# Patient Record
Sex: FEMALE | Race: WHITE | Employment: UNEMPLOYED | URBAN - METROPOLITAN AREA
[De-identification: names, ages, dates, MRNs, and addresses within clinical notes are randomized per-mention and may not be internally consistent; named-entity substitution may affect disease eponyms.]

---

## 2021-05-04 ENCOUNTER — APPOINTMENT (OUTPATIENT)
Dept: CT IMAGING | Age: 36
End: 2021-05-04
Payer: COMMERCIAL

## 2021-05-04 ENCOUNTER — HOSPITAL ENCOUNTER (EMERGENCY)
Age: 36
Discharge: HOME | End: 2021-05-04
Attending: EMERGENCY MEDICINE
Payer: COMMERCIAL

## 2021-05-04 VITALS
SYSTOLIC BLOOD PRESSURE: 107 MMHG | OXYGEN SATURATION: 98 % | WEIGHT: 180 LBS | BODY MASS INDEX: 31.89 KG/M2 | RESPIRATION RATE: 18 BRPM | DIASTOLIC BLOOD PRESSURE: 83 MMHG | HEIGHT: 63 IN | TEMPERATURE: 98.1 F | HEART RATE: 79 BPM

## 2021-05-04 DIAGNOSIS — R51.9 NONINTRACTABLE HEADACHE, UNSPECIFIED CHRONICITY PATTERN, UNSPECIFIED HEADACHE TYPE: Primary | ICD-10-CM

## 2021-05-04 DIAGNOSIS — R42 DIZZINESS: ICD-10-CM

## 2021-05-04 LAB
A/G RATIO: 1.2 (ref 1.1–2.2)
ALBUMIN SERPL-MCNC: 4.4 G/DL (ref 3.4–5)
ALP BLD-CCNC: 68 U/L (ref 40–129)
ALT SERPL-CCNC: 25 U/L (ref 10–40)
ANION GAP SERPL CALCULATED.3IONS-SCNC: 13 MMOL/L (ref 3–16)
AST SERPL-CCNC: 17 U/L (ref 15–37)
BASOPHILS ABSOLUTE: 0.1 K/UL (ref 0–0.2)
BASOPHILS RELATIVE PERCENT: 0.9 %
BILIRUB SERPL-MCNC: 0.4 MG/DL (ref 0–1)
BUN BLDV-MCNC: 10 MG/DL (ref 7–20)
CALCIUM SERPL-MCNC: 10 MG/DL (ref 8.3–10.6)
CHLORIDE BLD-SCNC: 102 MMOL/L (ref 99–110)
CO2: 24 MMOL/L (ref 21–32)
CREAT SERPL-MCNC: 0.6 MG/DL (ref 0.6–1.1)
EOSINOPHILS ABSOLUTE: 0.1 K/UL (ref 0–0.6)
EOSINOPHILS RELATIVE PERCENT: 0.8 %
GFR AFRICAN AMERICAN: >60
GFR NON-AFRICAN AMERICAN: >60
GLOBULIN: 3.7 G/DL
GLUCOSE BLD-MCNC: 88 MG/DL (ref 70–99)
HCG QUALITATIVE: NEGATIVE
HCT VFR BLD CALC: 43.4 % (ref 36–48)
HEMOGLOBIN: 14.7 G/DL (ref 12–16)
LYMPHOCYTES ABSOLUTE: 1.7 K/UL (ref 1–5.1)
LYMPHOCYTES RELATIVE PERCENT: 18 %
MCH RBC QN AUTO: 28 PG (ref 26–34)
MCHC RBC AUTO-ENTMCNC: 33.8 G/DL (ref 31–36)
MCV RBC AUTO: 82.8 FL (ref 80–100)
MONOCYTES ABSOLUTE: 0.6 K/UL (ref 0–1.3)
MONOCYTES RELATIVE PERCENT: 6.2 %
NEUTROPHILS ABSOLUTE: 7 K/UL (ref 1.7–7.7)
NEUTROPHILS RELATIVE PERCENT: 74.1 %
PDW BLD-RTO: 15.2 % (ref 12.4–15.4)
PLATELET # BLD: 275 K/UL (ref 135–450)
PMV BLD AUTO: 8.5 FL (ref 5–10.5)
POTASSIUM REFLEX MAGNESIUM: 3.7 MMOL/L (ref 3.5–5.1)
RBC # BLD: 5.23 M/UL (ref 4–5.2)
SODIUM BLD-SCNC: 139 MMOL/L (ref 136–145)
TOTAL PROTEIN: 8.1 G/DL (ref 6.4–8.2)
WBC # BLD: 9.4 K/UL (ref 4–11)

## 2021-05-04 PROCEDURE — 96365 THER/PROPH/DIAG IV INF INIT: CPT

## 2021-05-04 PROCEDURE — 80053 COMPREHEN METABOLIC PANEL: CPT

## 2021-05-04 PROCEDURE — 85025 COMPLETE CBC W/AUTO DIFF WBC: CPT

## 2021-05-04 PROCEDURE — 70498 CT ANGIOGRAPHY NECK: CPT

## 2021-05-04 PROCEDURE — 2580000003 HC RX 258: Performed by: EMERGENCY MEDICINE

## 2021-05-04 PROCEDURE — 6360000002 HC RX W HCPCS: Performed by: EMERGENCY MEDICINE

## 2021-05-04 PROCEDURE — 96375 TX/PRO/DX INJ NEW DRUG ADDON: CPT

## 2021-05-04 PROCEDURE — 93005 ELECTROCARDIOGRAM TRACING: CPT | Performed by: EMERGENCY MEDICINE

## 2021-05-04 PROCEDURE — 70450 CT HEAD/BRAIN W/O DYE: CPT

## 2021-05-04 PROCEDURE — 6360000004 HC RX CONTRAST MEDICATION: Performed by: EMERGENCY MEDICINE

## 2021-05-04 PROCEDURE — 99283 EMERGENCY DEPT VISIT LOW MDM: CPT

## 2021-05-04 PROCEDURE — 84703 CHORIONIC GONADOTROPIN ASSAY: CPT

## 2021-05-04 RX ORDER — PROCHLORPERAZINE EDISYLATE 5 MG/ML
10 INJECTION INTRAMUSCULAR; INTRAVENOUS ONCE
Status: COMPLETED | OUTPATIENT
Start: 2021-05-04 | End: 2021-05-04

## 2021-05-04 RX ORDER — KETOROLAC TROMETHAMINE 30 MG/ML
15 INJECTION, SOLUTION INTRAMUSCULAR; INTRAVENOUS ONCE
Status: COMPLETED | OUTPATIENT
Start: 2021-05-04 | End: 2021-05-04

## 2021-05-04 RX ORDER — BUTALBITAL, ACETAMINOPHEN AND CAFFEINE 50; 325; 40 MG/1; MG/1; MG/1
1 TABLET ORAL EVERY 6 HOURS PRN
Qty: 30 TABLET | Refills: 0 | Status: SHIPPED | OUTPATIENT
Start: 2021-05-04

## 2021-05-04 RX ORDER — MAGNESIUM SULFATE HEPTAHYDRATE 500 MG/ML
1000 INJECTION, SOLUTION INTRAMUSCULAR; INTRAVENOUS ONCE
Status: DISCONTINUED | OUTPATIENT
Start: 2021-05-04 | End: 2021-05-04 | Stop reason: CLARIF

## 2021-05-04 RX ORDER — MAGNESIUM SULFATE 1 G/100ML
1000 INJECTION INTRAVENOUS ONCE
Status: COMPLETED | OUTPATIENT
Start: 2021-05-04 | End: 2021-05-04

## 2021-05-04 RX ORDER — 0.9 % SODIUM CHLORIDE 0.9 %
1000 INTRAVENOUS SOLUTION INTRAVENOUS ONCE
Status: COMPLETED | OUTPATIENT
Start: 2021-05-04 | End: 2021-05-04

## 2021-05-04 RX ORDER — DIPHENHYDRAMINE HYDROCHLORIDE 50 MG/ML
25 INJECTION INTRAMUSCULAR; INTRAVENOUS ONCE
Status: COMPLETED | OUTPATIENT
Start: 2021-05-04 | End: 2021-05-04

## 2021-05-04 RX ADMIN — KETOROLAC TROMETHAMINE 15 MG: 30 INJECTION, SOLUTION INTRAMUSCULAR; INTRAVENOUS at 19:32

## 2021-05-04 RX ADMIN — PROCHLORPERAZINE EDISYLATE 10 MG: 5 INJECTION INTRAMUSCULAR; INTRAVENOUS at 17:44

## 2021-05-04 RX ADMIN — SODIUM CHLORIDE 1000 ML: 9 INJECTION, SOLUTION INTRAVENOUS at 17:44

## 2021-05-04 RX ADMIN — IOPAMIDOL 85 ML: 755 INJECTION, SOLUTION INTRAVENOUS at 18:58

## 2021-05-04 RX ADMIN — MAGNESIUM SULFATE HEPTAHYDRATE 1000 MG: 1 INJECTION, SOLUTION INTRAVENOUS at 19:07

## 2021-05-04 RX ADMIN — DIPHENHYDRAMINE HYDROCHLORIDE 25 MG: 50 INJECTION, SOLUTION INTRAMUSCULAR; INTRAVENOUS at 17:44

## 2021-05-04 ASSESSMENT — PAIN DESCRIPTION - LOCATION: LOCATION: HEAD

## 2021-05-04 ASSESSMENT — PAIN DESCRIPTION - PAIN TYPE: TYPE: ACUTE PAIN

## 2021-05-04 ASSESSMENT — PAIN SCALES - GENERAL: PAINLEVEL_OUTOF10: 10

## 2021-05-04 NOTE — ED PROVIDER NOTES
[address] ED    CHIEF COMPLAINT  Dizziness (patient states thursday at the grocery store she turned her head and started to feel dizzy. Patient called her son to pick her up and she had a breif syncope episode. Tong went to [facility] today because she continues to feel dizzy and has pressure in her head. NP at [facility] sent her to ER for eval for vertigo, and states patients pupils were sluggish to react. NP also reports positive Romberg test)       HISTORY OF PRESENT ILLNESS  Ej Tom is a 28 y.o. female who presents to the ED with complaint of dizziness. Pt developed symptoms on Thursday. At that time, developed a headache that she initially thought felt consistent with previous migraines. Went to [address] to get some groceries. Turned her head to the right and felt dizziness. Son came to get her and reported that she passed out. With regard to the headache, this feels different than her previous migraines. In this instance the headache is diffuse, across the front of the head and radiating down the left side of her neck. 10/10 intensity, throbbing. Migraines are usually more of an aching sensation. Complains of blurry vision with spots in it. Felt generally weak on Thursday but no focal weakness or numbness. Denies chest pain, SOB. Complains of nausea. Emesis on Thursday ada and Friday morning, now resolved. Denies dysuria, hematuria. No other complaints, modifying factors or associated symptoms. I have reviewed the following from the nursing documentation:    Past Medical History:   Diagnosis Date    Anxiety     Headache(784.0)     Influenza A 12/17/14    Major depressive disorder     Restless legs syndrome      Past Surgical History:   Procedure Laterality Date    CHOLECYSTECTOMY      UTERINE FIBROID SURGERY       History reviewed. No pertinent family history.   Social History     Socioeconomic History    Marital status:      Spouse name: Not on file    Number of children: Not on file    Years of education: Not on file    Highest education level: Not on file   Occupational History    Not on file   Social Needs    Financial resource strain: Not on file    Food insecurity     Worry: Not on file     Inability: Not on file    Transportation needs     Medical: Not on file     Non-medical: Not on file   Tobacco Use    Smoking status: Former Smoker     Packs/day: 0.25    Smokeless tobacco: Never Used   Substance and Sexual Activity    Alcohol use: Yes     Comment: socially    Drug use: No    Sexual activity: Yes     Partners: Male   Lifestyle    Physical activity     Days per week: Not on file     Minutes per session: Not on file    Stress: Not on file   Relationships    Social connections     Talks on phone: Not on file     Gets together: Not on file     Attends Druze service: Not on file     Active member of club or organization: Not on file     Attends meetings of clubs or organizations: Not on file     Relationship status: Not on file    Intimate partner violence     Fear of current or ex partner: Not on file     Emotionally abused: Not on file     Physically abused: Not on file     Forced sexual activity: Not on file   Other Topics Concern    Not on file   Social History Narrative    Not on file     Current Facility-Administered Medications   Medication Dose Route Frequency Provider Last Rate Last Admin    magnesium sulfate 1000 mg in dextrose 5% 100 mL IVPB  1,000 mg Intravenous Once Josefina Vigil  mL/hr at 05/04/21 1907 1,000 mg at 05/04/21 1907    ketorolac (TORADOL) injection 15 mg  15 mg Intravenous Once Josefina Vigil MD         Current Outpatient Medications   Medication Sig Dispense Refill    butalbital-acetaminophen-caffeine (FIORICET, ESGIC) -40 MG per tablet Take 1 tablet by mouth every 6 hours as needed for Migraine 30 tablet 0    gabapentin (NEURONTIN) 300 MG capsule 1 tab at hs  For 2 weeks then increase to 2 po Q-T Interval 394 ms    QTc Calculation (Bazett) 434 ms    P Axis 64 degrees    R Axis 62 degrees    T Axis 40 degrees    Diagnosis       Normal sinus rhythmNormal ECGNo previous ECGs available       RADIOLOGY  I have reviewed all radiographic studies for this visit. CTA HEAD NECK W CONTRAST   Final Result   Unremarkable CTA of the head and neck. CT HEAD WO CONTRAST   Final Result   No acute intracranial abnormality. ECG  EKG interpreted by myself. Rate: normal  Rhythm: NSR  Axis: normal  Intervals: within normal limits  ST Segments: no acute abnormality  T waves: no acute abnormality  Comparison: Compared to 6/4/16, rate is slower by 31 bpm and there is resolution of PVCs  Impression: NSR with no acute abnormality       ED COURSE/MDM  Patient seen and evaluated. Old records reviewed. Labs and imaging reviewed and results discussed with patient/family to extent possible. This is a 24-year-old female who presents with complaint of headache and dizziness with change in posture. On arrival patient is slightly hypertensive with otherwise reassuring vital signs. Neurological exam is nonfocal.  There are no carotid bruits. Symptoms are not elicited with change in posture. EKG normal sinus rhythm with no acute abnormality. Given headache different than previous migraines did obtain noncontrast head CT and CT angiography of the head and neck, both of which were unremarkable. Patient did prefer the CT angiography as opposed to a lumbar puncture after lengthy discussion. Renal panel no significant electrolyte abnormalities or creatinine elevation. Pregnancy test negative. CBC no leukocytosis or anemia. Symptoms improved after Compazine, Benadryl, fluids, magnesium. With reassuring cross-sectional imaging will administer Toradol. Patient states she feels well enough to go home. Will prescribe Fioricet on an as-needed basis.   Patient requests us to arrange for PCP for her, which was

## 2021-05-05 LAB
EKG ATRIAL RATE: 73 BPM
EKG DIAGNOSIS: NORMAL
EKG P AXIS: 64 DEGREES
EKG P-R INTERVAL: 130 MS
EKG Q-T INTERVAL: 394 MS
EKG QRS DURATION: 90 MS
EKG QTC CALCULATION (BAZETT): 434 MS
EKG R AXIS: 62 DEGREES
EKG T AXIS: 40 DEGREES
EKG VENTRICULAR RATE: 73 BPM

## 2021-05-05 PROCEDURE — 93010 ELECTROCARDIOGRAM REPORT: CPT | Performed by: INTERNAL MEDICINE

## 2021-05-05 NOTE — ED NOTES
..Patient discharged to home, alert, oriented, skin warm, dry and pink. Denies needs and or questions.  Will follow up as directed, patient encouraged to return for worsening or new symptoms or other concerns       Katia Aggarwal RN  05/04/21 2011

## 2022-05-30 ENCOUNTER — HOSPITAL ENCOUNTER (EMERGENCY)
Age: 37
Discharge: HOME | End: 2022-05-30
Attending: EMERGENCY MEDICINE

## 2022-05-30 ENCOUNTER — APPOINTMENT (OUTPATIENT)
Dept: GENERAL RADIOLOGY | Age: 37
End: 2022-05-30

## 2022-05-30 VITALS
TEMPERATURE: 98.6 F | RESPIRATION RATE: 18 BRPM | DIASTOLIC BLOOD PRESSURE: 80 MMHG | HEIGHT: 63 IN | OXYGEN SATURATION: 97 % | WEIGHT: 176 LBS | HEART RATE: 85 BPM | BODY MASS INDEX: 31.18 KG/M2 | SYSTOLIC BLOOD PRESSURE: 126 MMHG

## 2022-05-30 DIAGNOSIS — R07.9 CHEST PAIN, UNSPECIFIED TYPE: Primary | ICD-10-CM

## 2022-05-30 LAB
A/G RATIO: 2.2 (ref 1.1–2.2)
ALBUMIN SERPL-MCNC: 4.9 G/DL (ref 3.4–5)
ALP BLD-CCNC: 75 U/L (ref 40–129)
ALT SERPL-CCNC: 15 U/L (ref 10–40)
ANION GAP SERPL CALCULATED.3IONS-SCNC: 14 MMOL/L (ref 3–16)
AST SERPL-CCNC: 17 U/L (ref 15–37)
BASOPHILS ABSOLUTE: 0.1 K/UL (ref 0–0.2)
BASOPHILS RELATIVE PERCENT: 0.5 %
BILIRUB SERPL-MCNC: 0.6 MG/DL (ref 0–1)
BUN BLDV-MCNC: 17 MG/DL (ref 7–20)
CALCIUM SERPL-MCNC: 9.2 MG/DL (ref 8.3–10.6)
CHLORIDE BLD-SCNC: 100 MMOL/L (ref 99–110)
CO2: 23 MMOL/L (ref 21–32)
CREAT SERPL-MCNC: 1 MG/DL (ref 0.6–1.1)
EKG ATRIAL RATE: 95 BPM
EKG DIAGNOSIS: NORMAL
EKG P AXIS: 58 DEGREES
EKG P-R INTERVAL: 126 MS
EKG Q-T INTERVAL: 350 MS
EKG QRS DURATION: 82 MS
EKG QTC CALCULATION (BAZETT): 439 MS
EKG R AXIS: 33 DEGREES
EKG T AXIS: 29 DEGREES
EKG VENTRICULAR RATE: 95 BPM
EOSINOPHILS ABSOLUTE: 0.1 K/UL (ref 0–0.6)
EOSINOPHILS RELATIVE PERCENT: 0.6 %
GFR AFRICAN AMERICAN: >60
GFR NON-AFRICAN AMERICAN: >60
GLUCOSE BLD-MCNC: 169 MG/DL (ref 70–99)
HCT VFR BLD CALC: 39.6 % (ref 36–48)
HEMOGLOBIN: 13.5 G/DL (ref 12–16)
LYMPHOCYTES ABSOLUTE: 1.3 K/UL (ref 1–5.1)
LYMPHOCYTES RELATIVE PERCENT: 10.9 %
MCH RBC QN AUTO: 28.5 PG (ref 26–34)
MCHC RBC AUTO-ENTMCNC: 34 G/DL (ref 31–36)
MCV RBC AUTO: 83.8 FL (ref 80–100)
MONOCYTES ABSOLUTE: 0.6 K/UL (ref 0–1.3)
MONOCYTES RELATIVE PERCENT: 5.5 %
NEUTROPHILS ABSOLUTE: 9.5 K/UL (ref 1.7–7.7)
NEUTROPHILS RELATIVE PERCENT: 82.5 %
PDW BLD-RTO: 14.1 % (ref 12.4–15.4)
PLATELET # BLD: 278 K/UL (ref 135–450)
PMV BLD AUTO: 7.7 FL (ref 5–10.5)
POTASSIUM SERPL-SCNC: 3.4 MMOL/L (ref 3.5–5.1)
RBC # BLD: 4.73 M/UL (ref 4–5.2)
SODIUM BLD-SCNC: 137 MMOL/L (ref 136–145)
TOTAL PROTEIN: 7.1 G/DL (ref 6.4–8.2)
TROPONIN: <0.01 NG/ML
TROPONIN: <0.01 NG/ML
WBC # BLD: 11.5 K/UL (ref 4–11)

## 2022-05-30 PROCEDURE — 99285 EMERGENCY DEPT VISIT HI MDM: CPT

## 2022-05-30 PROCEDURE — 85025 COMPLETE CBC W/AUTO DIFF WBC: CPT

## 2022-05-30 PROCEDURE — 93010 ELECTROCARDIOGRAM REPORT: CPT | Performed by: INTERNAL MEDICINE

## 2022-05-30 PROCEDURE — 6360000002 HC RX W HCPCS: Performed by: EMERGENCY MEDICINE

## 2022-05-30 PROCEDURE — 96374 THER/PROPH/DIAG INJ IV PUSH: CPT

## 2022-05-30 PROCEDURE — 80053 COMPREHEN METABOLIC PANEL: CPT

## 2022-05-30 PROCEDURE — 93005 ELECTROCARDIOGRAM TRACING: CPT | Performed by: EMERGENCY MEDICINE

## 2022-05-30 PROCEDURE — 96375 TX/PRO/DX INJ NEW DRUG ADDON: CPT

## 2022-05-30 PROCEDURE — 84484 ASSAY OF TROPONIN QUANT: CPT

## 2022-05-30 PROCEDURE — 71045 X-RAY EXAM CHEST 1 VIEW: CPT

## 2022-05-30 RX ORDER — ONDANSETRON 2 MG/ML
4 INJECTION INTRAMUSCULAR; INTRAVENOUS ONCE
Status: COMPLETED | OUTPATIENT
Start: 2022-05-30 | End: 2022-05-30

## 2022-05-30 RX ORDER — KETOROLAC TROMETHAMINE 30 MG/ML
15 INJECTION, SOLUTION INTRAMUSCULAR; INTRAVENOUS ONCE
Status: COMPLETED | OUTPATIENT
Start: 2022-05-30 | End: 2022-05-30

## 2022-05-30 RX ORDER — CHLORAL HYDRATE 500 MG
3000 CAPSULE ORAL 3 TIMES DAILY
COMMUNITY

## 2022-05-30 RX ORDER — MORPHINE SULFATE 4 MG/ML
4 INJECTION, SOLUTION INTRAMUSCULAR; INTRAVENOUS ONCE
Status: COMPLETED | OUTPATIENT
Start: 2022-05-30 | End: 2022-05-30

## 2022-05-30 RX ADMIN — KETOROLAC TROMETHAMINE 15 MG: 30 INJECTION, SOLUTION INTRAMUSCULAR; INTRAVENOUS at 05:12

## 2022-05-30 RX ADMIN — ONDANSETRON 4 MG: 2 INJECTION INTRAMUSCULAR; INTRAVENOUS at 02:00

## 2022-05-30 RX ADMIN — MORPHINE SULFATE 4 MG: 4 INJECTION, SOLUTION INTRAMUSCULAR; INTRAVENOUS at 02:00

## 2022-05-30 ASSESSMENT — PAIN DESCRIPTION - LOCATION: LOCATION: CHEST

## 2022-05-30 ASSESSMENT — PAIN - FUNCTIONAL ASSESSMENT: PAIN_FUNCTIONAL_ASSESSMENT: 0-10

## 2022-05-30 ASSESSMENT — PAIN DESCRIPTION - DESCRIPTORS: DESCRIPTORS: PRESSURE

## 2022-05-30 ASSESSMENT — PAIN DESCRIPTION - PAIN TYPE: TYPE: ACUTE PAIN

## 2022-05-30 ASSESSMENT — PAIN SCALES - GENERAL
PAINLEVEL_OUTOF10: 6
PAINLEVEL_OUTOF10: 0

## 2022-05-30 NOTE — ED PROVIDER NOTES
[facility]      CHIEF COMPLAINT  Chest Pain      HISTORY OF PRESENT ILLNESS  Sonido Carroll is a 40 y.o. female with a history of anxiety, depression and restless leg syndrome presents with chest pain. She states she has been having chest pains for the past several days. She states that she saw her primary care doctor who referred her to cardiology for follow-up. She states she got concerned tonight because she got a little short of breath and diaphoretic at home. She is feeling little better here. No syncope. No cough or congestion. No fevers. She has no known cardiac history. .   No other complaints, modifying factors or associated symptoms. I have reviewed the following from the nursing documentation. Past Medical History:   Diagnosis Date    Anxiety     Headache(784.0)     Influenza A 12/17/14    Major depressive disorder     Restless legs syndrome      Past Surgical History:   Procedure Laterality Date    CHOLECYSTECTOMY      UTERINE FIBROID SURGERY       No family history on file.   Social History     Socioeconomic History    Marital status:      Spouse name: Not on file    Number of children: Not on file    Years of education: Not on file    Highest education level: Not on file   Occupational History    Not on file   Tobacco Use    Smoking status: Former Smoker     Packs/day: 0.25    Smokeless tobacco: Never Used   Substance and Sexual Activity    Alcohol use: Yes     Comment: socially    Drug use: No    Sexual activity: Yes     Partners: Male   Other Topics Concern    Not on file   Social History Narrative    Not on file     Social Determinants of Health     Financial Resource Strain:     Difficulty of Paying Living Expenses: Not on file   Food Insecurity:     Worried About Running Out of Food in the Last Year: Not on file    Alessandro of Food in the Last Year: Not on file   Transportation Needs:     Lack of Transportation (Medical): Not on file    Lack of Transportation (Non-Medical): Not on file   Physical Activity:     Days of Exercise per Week: Not on file    Minutes of Exercise per Session: Not on file   Stress:     Feeling of Stress : Not on file   Social Connections:     Frequency of Communication with Friends and Family: Not on file    Frequency of Social Gatherings with Friends and Family: Not on file    Attends [employer] Services: Not on file    Active Member of [address] [place] [address] or Organizations: Not on file    Attends Club or Organization Meetings: Not on file    Marital Status: Not on file   Intimate Partner Violence:     Fear of Current or Ex-Partner: Not on file    Emotionally Abused: Not on file    Physically Abused: Not on file    Sexually Abused: Not on file   Housing Stability:     Unable to Pay for Housing in the Last Year: Not on file    Number of [place] in the Last Year: Not on file    Unstable Housing in the Last Year: Not on file     No current facility-administered medications for this encounter. Current Outpatient Medications   Medication Sig Dispense Refill    Multiple Vitamin (MULTI-VITAMIN DAILY PO) Take by mouth      Omega-3 Fatty Acids (FISH OIL) 1000 MG CAPS Take 3,000 mg by mouth 3 times daily      butalbital-acetaminophen-caffeine (FIORICET, ESGIC) -40 MG per tablet Take 1 tablet by mouth every 6 hours as needed for Migraine 30 tablet 0    gabapentin (NEURONTIN) 300 MG capsule 1 tab at hs  For 2 weeks then increase to 2 po qhs 60 capsule 3    lidocaine (LIDODERM) 5 % Place 1 patch onto the skin daily. 12 hours on, 12 hours off. 30 patch 0    HYDROcodone-acetaminophen (NORCO) 5-325 MG per tablet Take 1 tablet by mouth every 8 hours as needed for Pain. 40 tablet 0    ibuprofen (ADVIL;MOTRIN) 600 MG tablet Take 1 tablet by mouth every 8 hours as needed for Pain.  90 tablet 3     Allergies   Allergen Reactions    Percocet [Oxycodone-Acetaminophen] Nausea Only    Codeine Nausea And Vomiting    Vicodin [Hydrocodone-Acetaminophen] Nausea And Vomiting       REVIEW OF SYSTEMS    General:  No fevers  Eyes:  No recent vison changes  ENT:  No sore throat, no nasal congestion  Cardiovascular:  no palpitations. Chest pains  Respiratory:   no cough, no wheezing  Gastrointestinal:  No abdominal pain, no vomiting, no diarrhea  Musculoskeletal:  No muscle pain, no joint pain  Skin:  No rash   Neurologic:  No speech problems, no headache, no extremity numbness, no extremity weakness  Genitourinary:  No dysuria  Extremities:  no edema, no pain      Unless otherwise stated in this report, this patient's positive and negative responses for review of systems (constitutional, eyes, ENT, cardiovascular, respiratory, gastrointestinal, neurological, genitourinary, musculoskeletal, integument systems and systems related to the presenting problem) are either stated in the preceding paragraph, were not pertinent or were negative for the symptoms and/or complaints related to the medical problem. PHYSICAL EXAM  /80   Pulse 85   Temp 98.6 °F (37 °C) (Oral)   Resp 18   Ht 5' 3\" (1.6 m)   Wt 176 lb (79.8 kg)   LMP 05/30/2022   SpO2 97%   BMI 31.18 kg/m²   GENERAL APPEARANCE: Awake and alert. Cooperative. No acute distress. HEAD: Normocephalic. Atraumatic. EYES: PERRL. EOM's grossly intact. ENT: Mucous membranes are moist.   NECK: Supple, trachea midline. HEART: RRR. Equal pulses in all 4 extremities. LUNGS: Respirations unlabored. CTAB. Good air exchange. No wheezes, rales, or rhonchi. Speaking comfortably in full sentences. ABDOMEN: Soft. Non-distended. Non-tender. No guarding or rebound. EXTREMITIES: No peripheral edema. MAEE. No acute deformities. SKIN: Warm, dry and intact. No acute rashes. NEUROLOGICAL: Alert and oriented X 3. CN II-XII grossly intact. Strength 5/5, sensation intact. PSYCHIATRIC: Normal mood and affect. LABS  I have reviewed all labs for this visit.    Results for orders placed or performed during the hospital encounter of 05/30/22   Comprehensive Metabolic Panel   Result Value Ref Range    Sodium 137 136 - 145 mmol/L    Potassium 3.4 (L) 3.5 - 5.1 mmol/L    Chloride 100 99 - 110 mmol/L    CO2 23 21 - 32 mmol/L    Anion Gap 14 3 - 16    Glucose 169 (H) 70 - 99 mg/dL    BUN 17 7 - 20 mg/dL    CREATININE 1.0 0.6 - 1.1 mg/dL    GFR Non-African American >60 >60    GFR African American >60 >60    Calcium 9.2 8.3 - 10.6 mg/dL    Total Protein 7.1 6.4 - 8.2 g/dL    Albumin 4.9 3.4 - 5.0 g/dL    Albumin/Globulin Ratio 2.2 1.1 - 2.2    Total Bilirubin 0.6 0.0 - 1.0 mg/dL    Alkaline Phosphatase 75 40 - 129 U/L    ALT 15 10 - 40 U/L    AST 17 15 - 37 U/L   CBC with Auto Differential   Result Value Ref Range    WBC 11.5 (H) 4.0 - 11.0 K/uL    RBC 4.73 4.00 - 5.20 M/uL    Hemoglobin 13.5 12.0 - 16.0 g/dL    Hematocrit 39.6 36.0 - 48.0 %    MCV 83.8 80.0 - 100.0 fL    MCH 28.5 26.0 - 34.0 pg    MCHC 34.0 31.0 - 36.0 g/dL    RDW 14.1 12.4 - 15.4 %    Platelets 591 448 - 931 K/uL    MPV 7.7 5.0 - 10.5 fL    Neutrophils % 82.5 %    Lymphocytes % 10.9 %    Monocytes % 5.5 %    Eosinophils % 0.6 %    Basophils % 0.5 %    Neutrophils Absolute 9.5 (H) 1.7 - 7.7 K/uL    Lymphocytes Absolute 1.3 1.0 - 5.1 K/uL    Monocytes Absolute 0.6 0.0 - 1.3 K/uL    Eosinophils Absolute 0.1 0.0 - 0.6 K/uL    Basophils Absolute 0.1 0.0 - 0.2 K/uL   Troponin   Result Value Ref Range    Troponin <0.01 <0.01 ng/mL   Troponin   Result Value Ref Range    Troponin <0.01 <0.01 ng/mL   EKG 12 Lead   Result Value Ref Range    Ventricular Rate 95 BPM    Atrial Rate 95 BPM    P-R Interval 126 ms    QRS Duration 82 ms    Q-T Interval 350 ms    QTc Calculation (Bazett) 439 ms    P Axis 58 degrees    R Axis 33 degrees    T Axis 29 degrees    Diagnosis       Normal sinus rhythmNormal ECGWhen compared with ECG of 04-MAY-2021 18:48,No significant change was found       EKG  The Ekg interpreted by myself  normal sinus rhythm with a rate of 95  Axis is   Normal  QTc is  normal  Intervals and Durations are unremarkable. No specific ST-T wave changes appreciated. No evidence of acute ischemia. No significant change from prior EKG dated May 4, 2021    Cardiac Monitoring: The cardiac monitor revealed normal sinus rhythm as interpreted by me. The cardiac monitor was ordered secondary to the patient's complaint of chest pain and to monitor the patient for dysrhythmia. RADIOLOGY  X-RAYS: ALL IMAGES INCLUDING PLAIN FILMS, CT, ULTRASOUND AND MRI HAVE BEEN READ BY THE RADIOLOGIST. I have personally reviewed plain film images and have reviewed the radiology reports. XR CHEST PORTABLE   Final Result   Clear chest without acute cardiopulmonary process. Rechecks: Physical assessment performed. Patient is been updated on her lab work and x-ray findings. She is agreeable to outpatient follow-up. Heart Score: HEART SCORE:  History: +2 high suspicion, +1 moderate, 0 low  EKG: +2 significant ST depression, +1 nonspec changes, 0 normal  Age: +2 >65, +1 45-65, 0 <45  Risk factors: +2 >3 or known CAD, +1 1-2, 0 none (factors = HLD, HTN, DM, tobacco, obesity, FHx)  Troponin: +2 >3x normal limit, +1 1-3x normal limit, 0 neg    Heart score: 2    ED COURSE/MDM  Patient seen and evaluated. Here the patient is afebrile with normal vitals signs. Old records reviewed. Here the patient is very well-appearing. EKG with no ischemic changes. Troponin is negative x2 sets. Chest x-ray is normal.  She is not having any tachycardia nor hypoxia. I have low suspicion for PE. No ripping or tearing pain to the back. I have low suspicion for aortic dissection. She has a heart score of 2. I think she is appropriate for outpatient follow-up. I do not think she needs to be admitted. Labs and imaging reviewed and results discussed with patient. Patient was reassessed as noted above . Plan of care discussed with patient.  Patient in agreement with plan. Strict return precautions have been given. I completed a HEART Score to screen for Major Adverse Cardiac Event (MACE) in this patient. The evidence indicates that the patient is very low risk for MACE and this is consistent with my clinical intuition. The risk of further workup or hospitalization for MACE is likely higher than the risk of the patient having a MACE. It is, therefore, in the patient's best interest not to do additional emergent testing or to be hospitalized for MACE. I have discussed with the patient my clinical impression and the result of the HEART Score to screen for MACE, as well as the risks of further testing and hospitalization. I estimate there is LOW risk for PULMONARY EMBOLISM, ACUTE CORONARY SYNDROME, OR THORACIC AORTIC DISSECTION, thus I consider the discharge disposition reasonable. Zachary Chino and I have discussed the diagnosis and risks, and we agree with discharging home to follow-up with their primary doctor. We also discussed returning to the Emergency Department immediately if new or worsening symptoms occur. We have discussed the symptoms which are most concerning (e.g., bloody sputum, fever, worsening pain or shortness of breath, vomiting) that necessitate immediate return. Patient was given scripts for the following medications. I counseled patient how to take these medications. Discharge Medication List as of 5/30/2022  6:15 AM            CLINICAL IMPRESSION  1. Chest pain, unspecified type        Blood pressure 126/80, pulse 85, temperature 98.6 °F (37 °C), temperature source Oral, resp. rate 18, height 5' 3\" (1.6 m), weight 176 lb (79.8 kg), last menstrual period 05/30/2022, SpO2 97 %. DISPOSITION  Zachary Chino was discharged to home in stable condition.     (Please note this note was completed with a voice recognition program.  Efforts were made to edit the dictations but occasionally words are mis-transcribed.)        Yevgeniy De Leon MD  06/02/22 2027

## 2022-05-30 NOTE — ED TRIAGE NOTES
Presents with c/o chest pain reporting pressure for the last few days saw her cardiologist and was to have \"additional testing\". states today pressure increased while she was looking at pictures. states started having sweating and was SOB.  Upon arrival alert and orientated skin warm pink and dry respirations easy and unlabored EMS gave 324 ASA and 4 zofran, patient refused Nitro,

## 2022-06-13 PROBLEM — R94.31 ABNORMAL EKG: Status: ACTIVE | Noted: 2022-06-13

## 2022-06-13 NOTE — PROGRESS NOTES
[facility]  [phone]      Attending Physician: DEVAUGHN Coulter    Reason for Consultation/Chief Complaint: New patient, abnormal EKG, ED follow up    Subjective   History of Present Illness:  Jose Miguel Chen is a 40 y.o. female who presents today as a new patient referred by her PCP for further evaluation of an abnormal EKG. Today she reports left side chest pain/pressure that radiates into her neck and shoulder. Causes her fingers to go numb on her left hand. She reports that she notices that this is more intense when working out and that this has been going on since before January 2022. She does not smoke. She reports edema in her fingers of both hands and in her ankles. Denies sob, dizziness, syncope and palpitations. Past Medical History:   has a past medical history of Anxiety, Headache(784.0), Influenza A, Major depressive disorder, and Restless legs syndrome. Surgical History:   has a past surgical history that includes Cholecystectomy and Uterine fibroid surgery. Social History:   reports that she has quit smoking. She smoked 0.25 packs per day. She has never used smokeless tobacco. She reports current alcohol use. She reports that she does not use drugs. Family History:  Maternal grandfather had heart disease. Hypertension throughout family. Grandmother suffered a stroke. Home Medications:  Were reviewed and are listed in nursing record and/or below  Prior to Admission medications    Medication Sig Start Date End Date Taking?  Authorizing Provider   Multiple Vitamin (MULTI-VITAMIN DAILY PO) Take by mouth  Patient not taking: Reported on 6/14/2022    Historical Provider, MD   Omega-3 Fatty Acids (FISH OIL) 1000 MG CAPS Take 3,000 mg by mouth 3 times daily  Patient not taking: Reported on 6/14/2022    Historical Provider, MD   butalbital-acetaminophen-caffeine (FIORICET, ESGIC) -40 MG per tablet Take 1 tablet by mouth every 6 hours as needed for Migraine  Patient not taking: Reported on 6/14/2022 5/4/21   Hollis Snell MD   gabapentin (NEURONTIN) 300 MG capsule 1 tab at hs  For 2 weeks then increase to 2 po qhs  Patient not taking: Reported on 6/14/2022 2/13/14   Keila Gu MD   lidocaine (LIDODERM) 5 % Place 1 patch onto the skin daily. 12 hours on, 12 hours off. Patient not taking: Reported on 6/14/2022 2/13/14   Keila Gu MD   HYDROcodone-acetaminophen (NORCO) 5-325 MG per tablet Take 1 tablet by mouth every 8 hours as needed for Pain. Patient not taking: Reported on 6/14/2022 1/30/14   Keila Gu MD   ibuprofen (ADVIL;MOTRIN) 600 MG tablet Take 1 tablet by mouth every 8 hours as needed for Pain. Patient not taking: Reported on 6/14/2022 1/30/14   Keila Gu MD        CURRENT Medications:  No current facility-administered medications for this visit. Allergies:  Percocet [oxycodone-acetaminophen], Codeine, and Vicodin [hydrocodone-acetaminophen]     Review of Systems:   A 14 point review of symptoms completed. Pertinent positives identified in the HPI, all other review of symptoms negative as below.       Objective   PHYSICAL EXAM:    Vitals:    06/14/22 0921   BP: 136/84   Pulse: (!) 104   SpO2: 99%    Weight: 176 lb (79.8 kg)         General Appearance:  Alert, cooperative, no distress, appears stated age   Head:  Normocephalic, without obvious abnormality, atraumatic   Eyes:  PERRL, conjunctiva/corneas clear   Nose: Nares normal, no drainage or sinus tenderness   Throat: Lips, mucosa, and tongue normal   Neck: Supple, symmetrical, trachea midline, no adenopathy, thyroid: not enlarged, symmetric, no tenderness/mass/nodules, no carotid bruit or JVD   Lungs:   Clear to auscultation bilaterally, respirations unlabored   Chest Wall:  No deformity or tenderness   Heart:  Regular rate and rhythm, borderline tachy, S1, S2 normal, no murmur, rub or gallop   Abdomen:   Soft, non-tender, bowel sounds active all four quadrants,  no masses, no organomegaly   Extremities: Extremities normal, atraumatic, no cyanosis or edema   Pulses: 2+ and symmetric   Skin: Skin color, texture, turgor normal, no rashes or lesions   Pysch: Normal mood and affect   Neurologic: Normal gross motor and sensory exam.         Labs   CBC:   Lab Results   Component Value Date    WBC 11.5 2022    RBC 4.73 2022    HGB 13.5 2022    HCT 39.6 2022    MCV 83.8 2022    RDW 14.1 2022     2022     CMP:  Lab Results   Component Value Date     2022    K 3.4 2022    K 3.7 2021     2022    CO2 23 2022    BUN 17 2022    CREATININE 1.0 2022    GFRAA >60 2022    GFRAA >60 2012    AGRATIO 2.2 2022    LABGLOM >60 2022    LABGLOM >60 2010    GLUCOSE 169 2022    PROT 7.1 2022    PROT 7.4 2012    CALCIUM 9.2 2022    BILITOT 0.6 2022    ALKPHOS 75 2022    AST 17 2022    ALT 15 2022     PT/INR:  No results found for: PTINR  HgBA1c:No results found for: LABA1C  Lab Results   Component Value Date    TROPONINI <0.01 2022         Cardiac Data     Last EK22  NSR, HR 95    Today nsr/borderline stach     Echo:    Stress Test:    Cath:    Studies:       I have reviewed labs and imaging/xray/diagnostic testing in this note. Assessment and Plan      1. SOB (shortness of breath)    2. Abnormal EKG    3. Chest pain, unspecified type    4. Other fatigue    5. Palpitation        PLAN:  1. Limited Echo to evaluate for chest pain  2. Exercise Stress test (treadmill/running) to evaluate for chest pressure Call [phone] to schedule these tests  3. Follow up with NP 3  Months [facility] office          Scribe's attestation:   This note was scribed in the presence of Dr. Otoniel Mary MD   by KANDIS Rodriguez, Dr Otoniel Mary, personally performed the services described in this documentation, as scribed by the above signed scribe in my presence. It is both accurate and complete to my knowledge. I agree with the details independently gathered by the clinical support staff and the scribed note accurately describes my personal service to the patient. Thank you for allowing us to participate in the care of Lacy Soto. Please call me with any questions [phone].     Regina Snyder MD, [facility] [place]   Interventional Cardiologist  [facility]  [phone] [facility]  [phone] [address] [place] [address]  6/14/2022 9:52 AM

## 2022-06-14 ENCOUNTER — OFFICE VISIT (OUTPATIENT)
Dept: CARDIOLOGY CLINIC | Age: 37
End: 2022-06-14

## 2022-06-14 VITALS
SYSTOLIC BLOOD PRESSURE: 136 MMHG | OXYGEN SATURATION: 99 % | HEIGHT: 63 IN | BODY MASS INDEX: 31.18 KG/M2 | WEIGHT: 176 LBS | HEART RATE: 104 BPM | DIASTOLIC BLOOD PRESSURE: 84 MMHG

## 2022-06-14 DIAGNOSIS — R00.2 PALPITATION: ICD-10-CM

## 2022-06-14 DIAGNOSIS — R94.31 ABNORMAL EKG: ICD-10-CM

## 2022-06-14 DIAGNOSIS — R07.9 CHEST PAIN, UNSPECIFIED TYPE: ICD-10-CM

## 2022-06-14 DIAGNOSIS — R06.02 SOB (SHORTNESS OF BREATH): Primary | ICD-10-CM

## 2022-06-14 DIAGNOSIS — R53.83 OTHER FATIGUE: ICD-10-CM

## 2022-06-14 PROCEDURE — 99203 OFFICE O/P NEW LOW 30 MIN: CPT | Performed by: INTERNAL MEDICINE

## 2022-06-14 PROCEDURE — 93000 ELECTROCARDIOGRAM COMPLETE: CPT | Performed by: INTERNAL MEDICINE

## 2022-06-14 NOTE — PATIENT INSTRUCTIONS
PLAN:  1. Limited Echo to evaluate for chest pain  2. Exercise Stress test (treadmill/running) to evaluate for chest pressure Call [phone] to schedule these tests  3.  Follow up with NP 3  Months Anastacia bryan

## 2022-06-23 ENCOUNTER — TELEPHONE (OUTPATIENT)
Dept: CARDIOLOGY CLINIC | Age: 37
End: 2022-06-23

## 2022-06-23 ENCOUNTER — HOSPITAL ENCOUNTER (OUTPATIENT)
Dept: NON INVASIVE DIAGNOSTICS | Age: 37
Discharge: HOME | End: 2022-06-23

## 2022-06-23 DIAGNOSIS — R00.2 PALPITATIONS: Primary | ICD-10-CM

## 2022-06-23 DIAGNOSIS — R94.31 ABNORMAL EKG: ICD-10-CM

## 2022-06-23 DIAGNOSIS — R07.9 CHEST PAIN, UNSPECIFIED TYPE: ICD-10-CM

## 2022-06-23 DIAGNOSIS — R06.02 SOB (SHORTNESS OF BREATH): ICD-10-CM

## 2022-06-23 PROCEDURE — 93017 CV STRESS TEST TRACING ONLY: CPT

## 2022-06-23 PROCEDURE — 93308 TTE F-UP OR LMTD: CPT

## 2022-06-23 NOTE — TELEPHONE ENCOUNTER
----- Message from Rocío Redmond MD sent at 6/23/2022  1:11 PM EDT -----  Let patient know echo test shows normal heart function, no new orders or changes at this time. Thanks. Let patient know their stress test is normal, but does show ectopy, rec: cardiac event monitor and referral to EP.  Thanks.

## 2022-06-29 NOTE — TELEPHONE ENCOUNTER
Pt returned call. Relayed message.  V/U     Pt scheduled for monitor placement 7/18/22  Pt scheduled with [facility] 9/12/22

## 2022-07-18 ENCOUNTER — TELEPHONE (OUTPATIENT)
Dept: CARDIOLOGY CLINIC | Age: 37
End: 2022-07-18

## 2022-07-18 PROCEDURE — 93270 REMOTE 30 DAY ECG REV/REPORT: CPT | Performed by: INTERNAL MEDICINE

## 2022-07-18 NOTE — TELEPHONE ENCOUNTER
Monitor placed by Bridgett Addison 85  Length of monitor 14days   Monitor ordered by [facility] number [id number]  Kit ID Bluetooth  Activation successful prior to pt leaving office?  Yes

## 2022-08-11 PROCEDURE — 93272 ECG/REVIEW INTERPRET ONLY: CPT | Performed by: INTERNAL MEDICINE

## 2022-08-12 ENCOUNTER — TELEPHONE (OUTPATIENT)
Dept: CARDIOLOGY CLINIC | Age: 37
End: 2022-08-12

## 2022-08-12 DIAGNOSIS — R00.2 PALPITATIONS: Primary | ICD-10-CM

## 2022-08-12 NOTE — TELEPHONE ENCOUNTER
Called pt. Left VM with monitor results. Per pt HIPAA can leave message. Informed pt that if she has any questions regarding monitor results or VM left that she may return call to office.

## 2022-11-02 ENCOUNTER — OFFICE VISIT (OUTPATIENT)
Dept: CARDIOLOGY CLINIC | Age: 37
End: 2022-11-02

## 2022-11-02 VITALS
WEIGHT: 174.6 LBS | SYSTOLIC BLOOD PRESSURE: 140 MMHG | DIASTOLIC BLOOD PRESSURE: 82 MMHG | OXYGEN SATURATION: 100 % | HEIGHT: 63 IN | BODY MASS INDEX: 30.94 KG/M2 | HEART RATE: 92 BPM

## 2022-11-02 DIAGNOSIS — I49.3 PVC (PREMATURE VENTRICULAR CONTRACTION): ICD-10-CM

## 2022-11-02 DIAGNOSIS — I49.9 IRREGULAR HEART RATE: Primary | ICD-10-CM

## 2022-11-02 PROCEDURE — 93000 ELECTROCARDIOGRAM COMPLETE: CPT | Performed by: INTERNAL MEDICINE

## 2022-11-02 PROCEDURE — 99204 OFFICE O/P NEW MOD 45 MIN: CPT | Performed by: INTERNAL MEDICINE

## 2022-11-02 NOTE — PATIENT INSTRUCTIONS
Plan:  1. Use Apple Watch to monitor episodes  2. Start taking Nadolol 10 mg daily. If tolerated well, we will increase to 20 mg daily.    3. Follow up with EPNP in 8 weeks

## 2022-11-02 NOTE — PROGRESS NOTES
[address]   Cardiac Consultation    Date: 11/2/22  Patient Name: Dario Mccarthy  YOB: 1985    Primary Care Physician: Kendall Sifuentes DO    CHIEF COMPLAINT:   Chief Complaint   Patient presents with    New Patient    Palpitations     Pt states episodes occurs approx. 30 minutes after exercise     Chest Pain     Pressure in chest          HPI:  Dario Mccarthy is a 40 y.o. female who presents for evaluation of ectopy. On 06/14/2022 the patient was referred to interventional cardiology by her primary care physician for an abnormal ECG. She had complaints of left sided chest pain that radiated into her neck and shoulder, and caused her fingers to go numb on her left hand. She underwent a limited echocardiogram on 06/23/2022 that showed an LVEF of 55% and trivial tricuspid regurgitation. She completed a stress test as well that was normal, but she had ectopy throughout the exam. Patient wore a cardiac event monitor from 07/18/2022 to 08/01/2022 which demonstrated predominately SR with an average HR of 77 (). PAC burden <0.01%, PVC burden 0.42%. Today, 11/02/2022, she states she has been feeling well. She states that she was having palpitations which directed her to the emergency room. She has since seen interventional cardiology, who referred her to EP. She states she was working our everyday but has since stopped. She states that 30 - 90 minutes after exercise she started experiencing a \"thumping\" in her chest felt as if her heart was beating out of her chest. She states that she uses a cardio program for exercise. She wears an Apple Watch and presents with readings of her heart rate. Apple Watch readings from July shows episodes of frequent PVC's after exercise. She reports that she does have some similar episodes when she is just walking and while resting. Patient denies current edema, shortness of breath, dizziness or syncope.  She is not currently taking any current cardiac medications. Past Medical History:   has a past medical history of Anxiety, Headache(784.0), Influenza A, Major depressive disorder, and Restless legs syndrome. Surgical History:   has a past surgical history that includes Cholecystectomy and Uterine fibroid surgery. Social History:   reports that she has quit smoking. Her smoking use included cigarettes. She smoked an average of .25 packs per day. She has never used smokeless tobacco. She reports that she does not currently use alcohol. She reports that she does not use drugs. Family History:  family history includes Fainting in her mother; High Cholesterol in her mother; Hypertension in her father and mother; Stroke in her mother. Home Medications:  Outpatient Encounter Medications as of 11/2/2022   Medication Sig Dispense Refill    Multiple Vitamin (MULTI-VITAMIN DAILY PO) Take by mouth (Patient not taking: No sig reported)      Omega-3 Fatty Acids (FISH OIL) 1000 MG CAPS Take 3,000 mg by mouth 3 times daily (Patient not taking: No sig reported)      butalbital-acetaminophen-caffeine (FIORICET, ESGIC) -40 MG per tablet Take 1 tablet by mouth every 6 hours as needed for Migraine (Patient not taking: Reported on 6/14/2022) 30 tablet 0    gabapentin (NEURONTIN) 300 MG capsule 1 tab at hs  For 2 weeks then increase to 2 po qhs (Patient not taking: Reported on 6/14/2022) 60 capsule 3    lidocaine (LIDODERM) 5 % Place 1 patch onto the skin daily. 12 hours on, 12 hours off. (Patient not taking: Reported on 6/14/2022) 30 patch 0    HYDROcodone-acetaminophen (NORCO) 5-325 MG per tablet Take 1 tablet by mouth every 8 hours as needed for Pain. (Patient not taking: Reported on 6/14/2022) 40 tablet 0    ibuprofen (ADVIL;MOTRIN) 600 MG tablet Take 1 tablet by mouth every 8 hours as needed for Pain. (Patient not taking: Reported on 6/14/2022) 90 tablet 3     No facility-administered encounter medications on file as of 11/2/2022. Allergies:  Percocet [oxycodone-acetaminophen], Codeine, and Vicodin [hydrocodone-acetaminophen]     Review of Systems   Constitutional: Negative. HENT: Negative. Eyes: Negative. Respiratory: Negative. Cardiovascular: Negative. Gastrointestinal: Negative. Genitourinary: Negative. Musculoskeletal: Negative. Skin: Negative. Neurological: Negative. Hematological: Negative. Psychiatric/Behavioral: Negative. BP (!) 140/82   Pulse 92   Ht 5' 3\" (1.6 m)   Wt 174 lb 9.6 oz (79.2 kg)   SpO2 100%   BMI 30.93 kg/m²     Data:  ECG 11/2/22: Personally reviewed. Limited Echocardiogram 06/23/2022  Summary   Limited exam.   LV systolic function is normal with EF estimated at 55%. No regional wall motion abnormalities. Trivial tricuspid regurgitation. Systolic pulmonary artery pressure (SPAP) is normal estimated at 26 mmHg   (Right atrial pressure of 3 mmHg). Stress Test 06/23/2022  Summary   Normal plain exercise stress GXT. Note ventricular ectopy      Objective:  Physical Exam   Constitutional: She is oriented to person, place, and time. She appears well-developed and well-nourished. HENT:   Head: Normocephalic and atraumatic. Eyes: Pupils are equal, round, and reactive to light. Neck: Normal range of motion. Cardiovascular: Normal rate, regular rhythm and normal heart sounds. Pulmonary/Chest: Effort normal and breath sounds normal.   Abdominal: Soft. No tenderness. Musculoskeletal: Normal range of motion. She exhibits no edema. Neurological: She is alert and oriented to person, place, and time. Skin: Skin is warm and dry. Psychiatric: She has a normal mood and affect. Assessment:  Post exercise PVC's -she had monomorphic PVCs noted during her exercise test and for a few minutes into recovery. Patient reported increased palpitation 60-90 minutes after exercise.  Apple Watch readings show increased PVC's that correlate with the post exercise palpitations. Though these are unlikely to portend sustained ventricular arrhythmias in the presence of normal LV function and no demonstrable ischemic heart disease, they are symptomatic. We discussed the possibility of pharmacologic therapy for suppression of the symptomatic PVCs. In view of the fact that her PVCs are exercise-induced, they likely are responsive to adrenergic input and may respond to beta-blocker. Patient started on Nadolol 10 mg daily. If she tolerates this well, we will increase the dosage to 20 mg daily. Plan:  1. Use Apple Watch to monitor episodes  2. Start taking Nadolol 10 mg daily. If tolerated well, we will increase to 20 mg daily. 3. Follow up with EPNP in 8 weeks         QUALITY MEASURES  1. Tobacco Cessation Counseling: NA  2. Retake of BP if >140/90:   Yes  3. Documentation to PCP/referring for new patient:  Sent to PCP at close of office visit  4. CAD patient on anti-platelet: NA  5. CAD patient on STATIN therapy:  NA  6. Patient with CHF and aFib on anticoagulation:  NA      Ian Samson M.D.       Maty Lopez RN, am scribing for and in the presence of Dr. Ian Samson. 11/02/22 2:45 PM   Hoang Mckeon RN      I, Dr. Ian Samson, personally performed the services described in this documentation as scribed by Hoang Mckeon RN in my presence, and it is both accurate and complete.

## 2022-11-03 RX ORDER — NADOLOL 20 MG/1
10 TABLET ORAL DAILY
Qty: 90 TABLET | Refills: 0 | Status: SHIPPED | OUTPATIENT
Start: 2022-11-03

## 2025-05-16 ENCOUNTER — HOSPITAL ENCOUNTER (EMERGENCY)
Age: 40
Discharge: HOME | End: 2025-05-16
Attending: EMERGENCY MEDICINE

## 2025-05-16 VITALS
SYSTOLIC BLOOD PRESSURE: 121 MMHG | HEART RATE: 80 BPM | HEIGHT: 63 IN | OXYGEN SATURATION: 97 % | BODY MASS INDEX: 30.48 KG/M2 | RESPIRATION RATE: 18 BRPM | TEMPERATURE: 97.6 F | DIASTOLIC BLOOD PRESSURE: 75 MMHG | WEIGHT: 172 LBS

## 2025-05-16 DIAGNOSIS — N63.21 MASS OF UPPER OUTER QUADRANT OF LEFT BREAST: Primary | ICD-10-CM

## 2025-05-16 LAB — HCG UR QL: NEGATIVE

## 2025-05-16 PROCEDURE — 84703 CHORIONIC GONADOTROPIN ASSAY: CPT

## 2025-05-16 PROCEDURE — 99283 EMERGENCY DEPT VISIT LOW MDM: CPT

## 2025-05-16 ASSESSMENT — PAIN - FUNCTIONAL ASSESSMENT: PAIN_FUNCTIONAL_ASSESSMENT: 0-10

## 2025-05-16 ASSESSMENT — LIFESTYLE VARIABLES
HOW OFTEN DO YOU HAVE A DRINK CONTAINING ALCOHOL: MONTHLY OR LESS
HOW MANY STANDARD DRINKS CONTAINING ALCOHOL DO YOU HAVE ON A TYPICAL DAY: 1 OR 2

## 2025-05-16 ASSESSMENT — PAIN DESCRIPTION - DESCRIPTORS: DESCRIPTORS: BURNING;PRESSURE

## 2025-05-16 ASSESSMENT — PAIN DESCRIPTION - ORIENTATION: ORIENTATION: LEFT

## 2025-05-16 ASSESSMENT — PAIN SCALES - GENERAL: PAINLEVEL_OUTOF10: 7

## 2025-05-16 ASSESSMENT — PAIN DESCRIPTION - LOCATION: LOCATION: BREAST

## 2025-05-16 NOTE — ED PROVIDER NOTES
THE [facility]  EMERGENCY DEPARTMENT ENCOUNTER          NURSE PRACTITIONER NOTE     Date of evaluation: 5/16/2025    Chief Complaint     Breast Mass (Patient presents to ED with report of a left breast mass. Patient reports noting this breast mass for several months, growing in the past 2 weeks. Reports the breast is now more painful, states her doctor sent her in for further evaluation.)      History of Present Illness     Ayla Redmond is a 39 y.o. female who presents the emergency department for evaluation of left breast mass.  States that she first noticed a small less than quarter size lump approximately 2 months ago.  The area has become significantly enlarged and painful in the last 2 weeks.  She has not noticed any skin changes, pain in her axilla, nipple discharge or changes in her nipple.  She denies any family history of breast cancer.  She has never had a ultrasound or mammogram.  Denies any fevers or chills.  States she has occasional night sweats, denies associated weight loss.    With the exception of the above, there are no aggravating or alleviating factors.    Review of Systems     Pertinent positive and negative findings as documented above in HPI, otherwise all other systems were reviewed and negative     Past Medical, Surgical, Family, and Social History     Medical History:   Past Medical History:   Diagnosis Date    Anxiety     Headache(784.0)     Influenza A 12/17/14    Major depressive disorder     Restless legs syndrome        Surgical History:   Past Surgical History:   Procedure Laterality Date    CHOLECYSTECTOMY      UTERINE FIBROID SURGERY         Social History: She reports that she has quit smoking. Her smoking use included cigarettes. She has never used smokeless tobacco. She reports that she does not currently use alcohol. She reports that she does not use drugs.    Family History: Her family history includes Fainting in her mother; High Cholesterol in her mother;

## 2025-05-16 NOTE — DISCHARGE INSTRUCTIONS
Your primary care doctor repeat is going to send an order for a diagnostic mammogram to the  breast center.    The number to schedule with them is    [phone]     It is important that you fill out the financial assistance packet with  so that they can get you scheduled.    I also provided you with resources for community screening ultrasounds through  and [facility].  Because you have a mass, you likely need a diagnostic mammogram as opposed to a screening mammogram.  This means that the radiologist will review it in real-time and decide if you need additional testing like an ultrasound or fine-needle biopsy.    Please return to the emergency department if you develop new or worsening symptoms including significant increased pain,, redness, warmth, drainage, pus, fevers, chills

## 2025-05-16 NOTE — ED PROVIDER NOTES
ED Attending Attestation Note     Date of evaluation: 5/16/2025    This patient was seen by the advance practice provider.  I have seen and examined the patient, agree with the workup, evaluation, management and diagnosis. The care plan has been discussed.  My assessment reveals a 39-year-old female who presents to the emergency department concern for left-sided breast mass.    Patient sent in by PCP to help facilitate expedited workup of left-sided breast mass.  Patient states that this been present for multiple weeks but increasing in discomfort over the last few days.  Denies any nipple discharge.  No weight loss.  No chest pain or shortness of breath.  No additional complaints.    On examination fine adult female, speaking in complete sentences.  No increased work of breathing or accessory muscle use during respiration.  On focal examination of the left breast I find a palpable firmness measuring approximately 2 to 3 cm in diameter.  There is no skin changes including no dimpling of the skin or retraction of the nipple present.  No discharge from the nipple.  She is mildly tender to palpation of this mass but without any fluctuance.    Will reach out to PCP regarding expectations as we are not able to obtain rapid mammography or subspecialty ultrasound currently.    Anish Velarde MD MPH   Physician.        Anish Velarde MD  05/16/25 [id number]